# Patient Record
(demographics unavailable — no encounter records)

---

## 2024-10-16 NOTE — ASSESSMENT
[FreeTextEntry1] : Bariatric surgery history: kathryn Overweight / obesity comorbidities: nafld htn Current anti-obesity medications: none Obesity medication side effects: n/a  discussed nutrition at length education and cooking resoures shared discussed gradual incr in [place], only activities without any pain reviewed WL med use, but not interested now discuss fasting, resources shared

## 2024-10-16 NOTE — HISTORY OF PRESENT ILLNESS
[Home] : at home, [unfilled] , at the time of the visit. [Other Location: e.g. Home (Enter Location, City,State)___] : at [unfilled] [Verbal consent obtained from patient] : the patient, [unfilled] [FreeTextEntry1] : Patient presents for weight loss and overweight/obese comorbidity management  ms alvarez reports being very stuck sedentary life due to b/l severe knee OA and having a lot of hunger most days sleep is fine, mood is good has grandchildren sometimes visits weekends after 5+ minutes will have knee pain, walking not interested in WL medication, wants natural approach cooks most meals at home, some frozen UPF made at dinner mentality has been common HP CHO wary approach diet is prot/minimal starch/veg, some fruits, minimal UPF, 1 takeout per week enjoys cooking and eating a variety of foods motivated also interested in fasting therapy

## 2025-01-15 NOTE — ASSESSMENT
[FreeTextEntry1] : Bariatric surgery history: n Overweight / obesity comorbidities: nafld htn Current anti-obesity medications: n Obesity medication side effects: n  cont to maintain excellent changes new resources materials sent cont 3 - 4 mo check ins

## 2025-01-15 NOTE — HISTORY OF PRESENT ILLNESS
[Home] : at home, [unfilled] , at the time of the visit. [Other Location: e.g. Home (Enter Location, City,State)___] : at [unfilled] [Verbal consent obtained from patient] : the patient, [unfilled] [FreeTextEntry1] : Patient presents for weight loss and overweight/obese comorbidity management  doing v well 12 lb loss has been adding mostly veg to plate and not eating desserts  some interruption during period caring for mother who has ongoing health issues but now back on track, ready to review key nutrition concepts and make diet changes

## 2025-01-28 NOTE — PHYSICAL EXAM
[No Acute Distress] : no acute distress [Well Nourished] : well nourished [Well Developed] : well developed [Well-Appearing] : well-appearing [Normal Sclera/Conjunctiva] : normal sclera/conjunctiva [PERRL] : pupils equal round and reactive to light [EOMI] : extraocular movements intact [Normal Outer Ear/Nose] : the outer ears and nose were normal in appearance [Normal Oropharynx] : the oropharynx was normal [No JVD] : no jugular venous distention [No Lymphadenopathy] : no lymphadenopathy [Supple] : supple [Thyroid Normal, No Nodules] : the thyroid was normal and there were no nodules present [No Respiratory Distress] : no respiratory distress  [No Accessory Muscle Use] : no accessory muscle use [Clear to Auscultation] : lungs were clear to auscultation bilaterally [Normal Rate] : normal rate  [Regular Rhythm] : with a regular rhythm [Normal S1, S2] : normal S1 and S2 [No Murmur] : no murmur heard [No Carotid Bruits] : no carotid bruits [No Varicosities] : no varicosities [Pedal Pulses Present] : the pedal pulses are present [No Edema] : there was no peripheral edema [No Extremity Clubbing/Cyanosis] : no extremity clubbing/cyanosis [Soft] : abdomen soft [Non Tender] : non-tender [Non-distended] : non-distended [Normal Bowel Sounds] : normal bowel sounds [Normal Posterior Cervical Nodes] : no posterior cervical lymphadenopathy [Normal Anterior Cervical Nodes] : no anterior cervical lymphadenopathy [No CVA Tenderness] : no CVA  tenderness [No Spinal Tenderness] : no spinal tenderness [No Joint Swelling] : no joint swelling [Grossly Normal Strength/Tone] : grossly normal strength/tone [No Rash] : no rash [Coordination Grossly Intact] : coordination grossly intact [No Focal Deficits] : no focal deficits [Normal Gait] : normal gait [Normal Affect] : the affect was normal [Normal Insight/Judgement] : insight and judgment were intact [de-identified] : comrpession stocking on+ no pitting edema or tenderness

## 2025-01-28 NOTE — PHYSICAL EXAM
[No Acute Distress] : no acute distress [Well Nourished] : well nourished [Well Developed] : well developed [Well-Appearing] : well-appearing [Normal Sclera/Conjunctiva] : normal sclera/conjunctiva [PERRL] : pupils equal round and reactive to light [EOMI] : extraocular movements intact [Normal Outer Ear/Nose] : the outer ears and nose were normal in appearance [Normal Oropharynx] : the oropharynx was normal [No JVD] : no jugular venous distention [No Lymphadenopathy] : no lymphadenopathy [Supple] : supple [Thyroid Normal, No Nodules] : the thyroid was normal and there were no nodules present [No Respiratory Distress] : no respiratory distress  [No Accessory Muscle Use] : no accessory muscle use [Clear to Auscultation] : lungs were clear to auscultation bilaterally [Normal Rate] : normal rate  [Regular Rhythm] : with a regular rhythm [Normal S1, S2] : normal S1 and S2 [No Murmur] : no murmur heard [No Carotid Bruits] : no carotid bruits [No Varicosities] : no varicosities [Pedal Pulses Present] : the pedal pulses are present [No Edema] : there was no peripheral edema [No Extremity Clubbing/Cyanosis] : no extremity clubbing/cyanosis [Soft] : abdomen soft [Non Tender] : non-tender [Non-distended] : non-distended [Normal Bowel Sounds] : normal bowel sounds [Normal Posterior Cervical Nodes] : no posterior cervical lymphadenopathy [Normal Anterior Cervical Nodes] : no anterior cervical lymphadenopathy [No CVA Tenderness] : no CVA  tenderness [No Spinal Tenderness] : no spinal tenderness [No Joint Swelling] : no joint swelling [Grossly Normal Strength/Tone] : grossly normal strength/tone [No Rash] : no rash [Coordination Grossly Intact] : coordination grossly intact [No Focal Deficits] : no focal deficits [Normal Gait] : normal gait [Normal Affect] : the affect was normal [Normal Insight/Judgement] : insight and judgment were intact [de-identified] : comrpession stocking on+ no pitting edema or tenderness

## 2025-01-28 NOTE — ADDENDUM
[FreeTextEntry1] : This note was written by Daja Cabrera on 01/27/2025 acting as medical scribe for Dr. Lesley Harry. I, Dr. Lesley Harry, have read and attest that all the information, medical decision making and discharge instructions within are true and accurate.

## 2025-01-28 NOTE — HISTORY OF PRESENT ILLNESS
[Family Member] : family member [FreeTextEntry1] : establish care annual physical  leg swelling [de-identified] : Ms. NICOLLE AYALA is a 73 year old F with PMHx of HTN, obesity, HIV, Hep B, liver fibrosis who presents today for new patient eval and establish care. Pt reports leg swelling for 1 year. Pt is currently on hctz and was switched to carvedilol from amlo by primary doctor which helped with swelling but not resolved about 1 month ago. Leg swelling is worse at the end of the day She think shes been diagnosed with lymphedema or CVI but is not sure and wears compression stocking. Pt reports bp is normal at home in 110-120s and is only high in the office from nerves. Denies chest pain, sob, garcia, dizziness, diaphoresis, palpitations, LE swelling, orthopnea, syncope, n/v, headache.

## 2025-01-28 NOTE — HEALTH RISK ASSESSMENT
[0] : 2) Feeling down, depressed, or hopeless: Not at all (0) [PHQ-2 Negative - No further assessment needed] : PHQ-2 Negative - No further assessment needed [Never] : Never [[id number]] : 0

## 2025-01-28 NOTE — HISTORY OF PRESENT ILLNESS
[Family Member] : family member [FreeTextEntry1] : establish care annual physical  leg swelling [de-identified] : Ms. NICOLLE AYALA is a 73 year old F with PMHx of HTN, obesity, HIV, Hep B, liver fibrosis who presents today for new patient eval and establish care. Pt reports leg swelling for 1 year. Pt is currently on hctz and was switched to carvedilol from amlo by primary doctor which helped with swelling but not resolved about 1 month ago. Leg swelling is worse at the end of the day She think shes been diagnosed with lymphedema or CVI but is not sure and wears compression stocking. Pt reports bp is normal at home in 110-120s and is only high in the office from nerves. Denies chest pain, sob, garcia, dizziness, diaphoresis, palpitations, LE swelling, orthopnea, syncope, n/v, headache.

## 2025-01-28 NOTE — HEALTH RISK ASSESSMENT
[0] : 2) Feeling down, depressed, or hopeless: Not at all (0) [PHQ-2 Negative - No further assessment needed] : PHQ-2 Negative - No further assessment needed [Never] : Never [[id number]] : 0

## 2025-03-03 NOTE — HISTORY OF PRESENT ILLNESS
[FreeTextEntry1] : Ms. NICOLLE AYALA is a 73 year old F with PMHx of HTN, obesity, HIV, Hep B, liver fibrosis here to establish cardiac care. Seen by Dr. Harry on 01/27/2025. Venous duplex 01/2025 with no DVT. Echo completed 01/2025 with normal EF, ascending aorta 4.1 cm. moderate MR, mild TR, mild LVH, mild OK, and trace AR.  She reports of chest heaviness and pressing feeling on chest at times.  Denies chest pain, palpitations, diaphoresis, vision changes, HA, dizziness, syncope, cough, wheezing, SOB/CASTILLO, edema, fever, chills, infection.

## 2025-03-03 NOTE — PHYSICAL EXAM

## 2025-03-09 NOTE — END OF VISIT
[] : Fellow [Time Spent: ___ minutes] : I have spent [unfilled] minutes of time on the encounter which excludes teaching and separately reported services. [FreeTextEntry3] : I saw and evaluated the patient with NP Meenakshi.  I discussed the care of this patient with the NP providing the service, and was directly responsible for the patient's management. My discussion with the NP included the patient's history, physical exam, laboratory findings, and medical decision-making. I agree with the documented findings and plan of care of the NP's note. Spent > 30 minutes collectively in reviewing records, performing patient history and physical examination, and formulating recommendations/plan of care.

## 2025-03-09 NOTE — PLAN
[TextEntry] : PLAN - Hepatitis B virus DNA level previously remained undetectable. We will repeat bloodwork today. Recommended to continue with current HAART regimen. - Her MR elastography from April 3, 2024 was unremarkable. - I have recommended continued effort for weight loss through diet and exercise. Recommended to complete CARDS workup. - She was enrolled in the National Cancer Screening Trial after due consent. She is randomized to the standard of care arm ( US + AFP). She is due for follow up labs and US for hepatoma screening.   RTC in 6 months with repeat labs and US Abdomen.  The patient was examined, and the treatment plan was reviewed in consultation with Dr. Smith.  Homa Arrieta, MSN, FNP-BC Transplant Hepatology Nurse Practitioner [facility] [address] [place] [address] T: [phone] | F: [phone].

## 2025-03-09 NOTE — PHYSICAL EXAM
[General Appearance - Alert] : alert [General Appearance - In No Acute Distress] : in no acute distress [Sclera] : the sclera and conjunctiva were normal [Outer Ear] : the ears and nose were normal in appearance [Neck Appearance] : the appearance of the neck was normal [Arterial Pulses Carotid] : carotid pulses were normal with no bruits [Bowel Sounds] : normal bowel sounds [Abdomen Soft] : soft [Abdomen Tenderness] : non-tender [] : no hepato-splenomegaly [Abdomen Mass (___ Cm)] : no abdominal mass palpated [Abdomen Hernia] : no hernia was discovered [No CVA Tenderness] : no ~M costovertebral angle tenderness [Abnormal Walk] : normal gait [Cranial Nerves] : cranial nerves 2-12 were intact [Sensation] : the sensory exam was normal to light touch and pinprick [Motor Exam] : the motor exam was normal [Oriented To Time, Place, And Person] : oriented to person, place, and time [Affect] : the affect was normal [Scleral Icterus] : No Scleral Icterus

## 2025-03-09 NOTE — PLAN
[TextEntry] : PLAN - Hepatitis B virus DNA level previously remained undetectable. We will repeat bloodwork today. Recommended to continue with current HAART regimen. - Her MR elastography from April 3, 2024 was unremarkable. - I have recommended continued effort for weight loss through diet and exercise. Recommended to complete CARDS workup. - She was enrolled in the National Cancer Screening Trial after due consent. She is randomized to the standard of care arm ( US + AFP). She is due for follow up labs and US for hepatoma screening.   RTC in 6 months with repeat labs and US Abdomen.  The patient was examined, and the treatment plan was reviewed in consultation with Dr. Smith.  Homa Arrieta, MSN, FNP-BC Transplant Hepatology Nurse Practitioner [facility] [address] [place] [address] T: [phone] | F: [phone].

## 2025-03-09 NOTE — PLAN
[TextEntry] : PLAN - Hepatitis B virus DNA level previously remained undetectable. We will repeat bloodwork today. Recommended to continue with current HAART regimen. - Her MR elastography from April 3, 2024 was unremarkable. - I have recommended continued effort for weight loss through diet and exercise. Recommended to complete CARDS workup. - She was enrolled in the National Cancer Screening Trial after due consent. She is randomized to the standard of care arm ( US + AFP). She is due for follow up labs and US for hepatoma screening.   RTC in 6 months with repeat labs and US Abdomen.  The patient was examined, and the treatment plan was reviewed in consultation with Dr. Smtih.  Homa Arrieta, MSN, FNP-BC Transplant Hepatology Nurse Practitioner [facility] [address] [place] [address] T: [phone] | F: [phone].

## 2025-03-09 NOTE — HISTORY OF PRESENT ILLNESS
[FreeTextEntry1] : Ms. Elana Spencer, a 73-year-old Black female, presents for hepatology follow-up.  She has a history of HIV and HBV, managed with Biktarvy (bictegravir, emtricitabine, and tenofovir alafenamide), achieving undetectable HIV viral load. Her HBV is characterized by positive HBsAg and undetectable HBV DNA.  She also has a history of morbid obesity, vitamin B12 deficiency (treated with supplementation), dyslipidemia, and hypertension.  Initially seen in June 2019, she was asymptomatic with normal liver function tests (LFTs).  An abdominal ultrasound revealed a slightly ectatic distal aorta measuring 2.7 cm and an unremarkable liver.  Subsequent imaging in August 2019 identified a 1.4 cm hepatic cyst and benign renal cysts.  She reported weight loss through diet and exercise at her February 2020 visit, remaining asymptomatic with stable labs.  Follow-up imaging in March 2020 showed a normal liver.  She continued to do well throughout 2020 and 2021, maintaining normal LFTs, undetectable HBV DNA, and stable mild anemia. An October 2020 abdominal ultrasound was unremarkable.   In February 2022, she reported further weight loss and stable labs, including normal LFTs and undetectable HIV viral load.  An August 2022 follow-up revealed stable, albeit mildly anemic, blood counts and normal LFTs. An abdominal ultrasound in September 2022 showed hepatic steatosis, with a hypoechoic focus concerning for fatty sparing. A subsequent MRI did not show steatosis or HCC.    In January 2024, she reported an issue with her Biktarvy prescription, resulting in a detectable HIV viral load for the first time in a while. This was addressed with her infectious disease physician. She enrolled in the TRACER HCC screening trial, assigned to the standard of care arm.  By September 2024, she had gained weight and reported difficulty managing her hypertension despite being on carvedilol.  Repeat labs and imaging were performed, including an unremarkable MR elastography in April 2024.  Today,  she presents for follow-up. She recently underwent cardiovascular evaluation with Dr. Harry for an abdominal aortic aneurysm (AAA) and mitral valve regurgitation. Her antihypertensive regimen was adjusted and now consists of losartan 100mg daily, carvedilol 12.5mg BID, and furosemide 40mg daily.  A CT angiogram is scheduled with Dr. Krishna.  Repeat labs and HCC surveillance (currently in the SOC arm of an NIH-sponsored study) are due.

## 2025-03-09 NOTE — ASSESSMENT
[FreeTextEntry1] : Ms. Spencer is a 72-year-old female with a history of HIV, HBV, obesity, vitamin B12 deficiency, dyslipidemia, and hypertension.  Her HIV and HBV have been well-managed on Biktarvy, with historically undetectable HIV viral load and persistently undetectable HBV DNA.  She has demonstrated some fluctuation in weight over time. While she has maintained normal LFTs, imaging has revealed hepatic steatosis.  Her hypertension management is complex and has required medication adjustments.  She also has an abdominal aortic aneurysm and mitral valve regurgitation, for which she is undergoing further cardiovascular evaluation.  She is appropriately enrolled in an HCC surveillance program.  Continued monitoring of her chronic conditions is warranted.  Problems - Obesity - Chronic hepatitis B- controlled with undetectable viral load - HIV on HAART - Hypertension

## 2025-03-13 NOTE — END OF VISIT
[Time Spent: ___ minutes] : I have spent [unfilled] minutes of time on the encounter which excludes teaching and separately reported services. [] : Fellow [FreeTextEntry3] : I discussed this patient in a pre-clinic session with the fellow including review of clinical status and last labs. I was present with the resident/fellow during the key portions of the history and exam. I discussed the case with the resident/fellow and agree with the findings and plan as documented in the note.

## 2025-03-13 NOTE — ASSESSMENT
[FreeTextEntry1] : MIKE LOYD is a 73 year old woman with PMH of HIV on Biktarvy (viral load undetectable), chronic HBV (viral load undetectable), hepatic steatosis, who presents for Hematology follow up of macrocytic anemia.  # Macrocytic anemia: She has history of macrocytic anemia (Hgb 10.9 - 11.6,  - 110) since at least 2017 and was previously told she had B12 deficiency and was treated with parenteral B12. Her most recent labs show no evidence of iron/folate deficiency, hemolysis, or thyroid dysfunction. B12 is on lower range of normal, but MMA is normal. Possible contribution from liver disease. May represent early MDS but no indication for bone marrow evaluation as it would not change our management.  - Given her CBC remains stable for many years, no need for further Hematology evaluation. Patient may follow with PCP for surveillance CBCs and if new or worsening cytopenias or new concerns, she can always return to see hematology.   Seen and discussed with attending, Dr. Jacquie Thomas MD PGY-4 Heme/Onc Fellow

## 2025-03-13 NOTE — HISTORY OF PRESENT ILLNESS
[de-identified] : MIKE LOYD is a 73 year old woman with PMH of HIV on Biktarvy (viral load undetectable), chronic HBV (viral load undetectable), hepatic steatosis, who presents for Hematology follow up of macrocytic anemia.  She has history of macrocytic anemia (Hgb 10.9 - 11.6,  - 110) since at least 2017 in our records. She has normal WBC and platelet counts. She reports that she was seen by a hematologist at [facility] and was given B12 injections at two different times. She has not had an EGD evaluation. She eats a well-rounded diet.  She established care in our practice on 3/30/23. Her labs were notable Hgb 11.0 () - stable from prior CBCs. Normal B12, folate, copper, TSH, iron studies. No evidence of hemolysis. No intrinsic factor or anti-parietal cell antibodies. Unclear etiology for macrocytic anemia. May represent early MDS but no indication for bone marrow evaluation as it would not change our management.   Interval History:  - She returns today for repeat labs. She feels well overall and has no new complaints. Her Hgb is stable at 10.8 (). - She was previously on B12 supplementation for low-normal B12 in 2022 but discontinued it.   Family History: - CAD - No history of anemia  Social History: - Lives alone and is retired from working at [employer].  - No tobacco, alcohol, or drug use.

## 2025-03-24 NOTE — HISTORY OF PRESENT ILLNESS
[FreeTextEntry1] : This is a 73 year old F with PMHx of HTN, obesity, HIV, Hep B, liver fibrosis here for routine follow-up. Last seen 03/05/2025 to establish cardiac care. She reported chest heaviness and a pressing feeling on her chest at times.  Echo completed 01/2025 with normal EF, ascending aorta 4.1 cm. moderate MR, mild TR, mild LVH, mild SD, and trace AR. Order for CTCA. Elevated BP last OV. Started on losartan 100mg daily. B/l LE edema noted last OV. Started on Lasix 40mg daily. Patient to see vascular on April 3, 2025.  Patient states that she has not been experiencing chest pressure since last OV. Patient states that swelling in her legs has improved since starting Lasix 40mg daily.   Denies chest pain, palpitations, diaphoresis, vision changes, HA, dizziness, syncope, cough, wheezing, edema, fever, chills, infection.

## 2025-04-09 NOTE — ASSESSMENT
[FreeTextEntry1] : Bariatric surgery history: n Overweight / obesity comorbidities: predm nafld  Current anti-obesity medications: n Obesity medication side effects: n  contact us to review starting medication to help with nafld WL A1c otherwise follow up 6 mo or sooner prn

## 2025-04-09 NOTE — HISTORY OF PRESENT ILLNESS
[Telehealth (audio & video)] : This visit was provided via telehealth using real-time 2-way audio visual technology. [FreeTextEntry1] : Patient presents for weight loss and overweight/obese comorbidity management  doing well has maintained a ~10 lb weight loss has continued diet overall no questions or concerns walking as able but still with knee pain motivated to continue LS as means for WL but would consider WL med in future

## 2025-05-05 NOTE — HISTORY OF PRESENT ILLNESS
[FreeTextEntry1] : 3 month f/u [de-identified] : Ms. LOYD is a 73 year old F with PMHx of HTN, obesity, HIV, Hep B, liver fibrosis presenting for 3 month f/u.  Patient feels well. No complaints. Denies chest pain, sob, garcia, dizziness, orthopnea, diaphoresis, palpitations, LE swelling, syncope, n/v, headache.

## 2025-05-05 NOTE — PHYSICAL EXAM
[No Acute Distress] : no acute distress [Well Nourished] : well nourished [Well Developed] : well developed [Well-Appearing] : well-appearing [Normal Sclera/Conjunctiva] : normal sclera/conjunctiva [PERRL] : pupils equal round and reactive to light [EOMI] : extraocular movements intact [Normal Outer Ear/Nose] : the outer ears and nose were normal in appearance [Normal Oropharynx] : the oropharynx was normal [No JVD] : no jugular venous distention [No Lymphadenopathy] : no lymphadenopathy [Supple] : supple [Thyroid Normal, No Nodules] : the thyroid was normal and there were no nodules present [No Respiratory Distress] : no respiratory distress  [No Accessory Muscle Use] : no accessory muscle use [Clear to Auscultation] : lungs were clear to auscultation bilaterally [Normal Rate] : normal rate  [Regular Rhythm] : with a regular rhythm [Normal S1, S2] : normal S1 and S2 [No Murmur] : no murmur heard [No Carotid Bruits] : no carotid bruits [No Varicosities] : no varicosities [Pedal Pulses Present] : the pedal pulses are present [No Edema] : there was no peripheral edema [No Extremity Clubbing/Cyanosis] : no extremity clubbing/cyanosis [Soft] : abdomen soft [Non Tender] : non-tender [Non-distended] : non-distended [Normal Bowel Sounds] : normal bowel sounds [Normal Posterior Cervical Nodes] : no posterior cervical lymphadenopathy [Normal Anterior Cervical Nodes] : no anterior cervical lymphadenopathy [No CVA Tenderness] : no CVA  tenderness [No Spinal Tenderness] : no spinal tenderness [No Joint Swelling] : no joint swelling [Grossly Normal Strength/Tone] : grossly normal strength/tone [No Rash] : no rash [Coordination Grossly Intact] : coordination grossly intact [No Focal Deficits] : no focal deficits [Normal Gait] : normal gait [Normal Affect] : the affect was normal [Normal Insight/Judgement] : insight and judgment were intact [Alert and Oriented x3] : oriented to person, place, and time [de-identified] : lymphedema, no pitting edema

## 2025-05-05 NOTE — ADDENDUM
[FreeTextEntry1] : This note was written by Daja Caberra on 05/05/2025 acting as medical scribe for Dr. Lesley Harry. I, Dr. Lesley Harry, have read and attest that all the information, medical decision making and discharge instructions within are true and accurate.

## 2025-05-05 NOTE — HISTORY OF PRESENT ILLNESS
[FreeTextEntry1] : 3 month f/u [de-identified] : Ms. LOYD is a 73 year old F with PMHx of HTN, obesity, HIV, Hep B, liver fibrosis presenting for 3 month f/u.  Patient feels well. No complaints. Denies chest pain, sob, garcia, dizziness, orthopnea, diaphoresis, palpitations, LE swelling, syncope, n/v, headache.

## 2025-05-05 NOTE — HEALTH RISK ASSESSMENT
[0] : 2) Feeling down, depressed, or hopeless: Not at all (0) [PHQ-2 Negative - No further assessment needed] : PHQ-2 Negative - No further assessment needed [Never] : Never [[id number]] : 0

## 2025-05-05 NOTE — PHYSICAL EXAM
[No Acute Distress] : no acute distress [Well Nourished] : well nourished [Well Developed] : well developed [Well-Appearing] : well-appearing [Normal Sclera/Conjunctiva] : normal sclera/conjunctiva [PERRL] : pupils equal round and reactive to light [EOMI] : extraocular movements intact [Normal Outer Ear/Nose] : the outer ears and nose were normal in appearance [Normal Oropharynx] : the oropharynx was normal [No JVD] : no jugular venous distention [No Lymphadenopathy] : no lymphadenopathy [Supple] : supple [Thyroid Normal, No Nodules] : the thyroid was normal and there were no nodules present [No Respiratory Distress] : no respiratory distress  [No Accessory Muscle Use] : no accessory muscle use [Clear to Auscultation] : lungs were clear to auscultation bilaterally [Normal Rate] : normal rate  [Regular Rhythm] : with a regular rhythm [Normal S1, S2] : normal S1 and S2 [No Murmur] : no murmur heard [No Carotid Bruits] : no carotid bruits [No Varicosities] : no varicosities [Pedal Pulses Present] : the pedal pulses are present [No Edema] : there was no peripheral edema [No Extremity Clubbing/Cyanosis] : no extremity clubbing/cyanosis [Soft] : abdomen soft [Non Tender] : non-tender [Non-distended] : non-distended [Normal Bowel Sounds] : normal bowel sounds [Normal Posterior Cervical Nodes] : no posterior cervical lymphadenopathy [Normal Anterior Cervical Nodes] : no anterior cervical lymphadenopathy [No CVA Tenderness] : no CVA  tenderness [No Spinal Tenderness] : no spinal tenderness [No Joint Swelling] : no joint swelling [Grossly Normal Strength/Tone] : grossly normal strength/tone [No Rash] : no rash [Coordination Grossly Intact] : coordination grossly intact [No Focal Deficits] : no focal deficits [Normal Gait] : normal gait [Normal Affect] : the affect was normal [Normal Insight/Judgement] : insight and judgment were intact [Alert and Oriented x3] : oriented to person, place, and time [de-identified] : lymphedema, no pitting edema

## 2025-05-05 NOTE — HEALTH RISK ASSESSMENT
[0] : 2) Feeling down, depressed, or hopeless: Not at all (0) [PHQ-2 Negative - No further assessment needed] : PHQ-2 Negative - No further assessment needed [Never] : Never [[id number]] : 0

## 2025-05-05 NOTE — ADDENDUM
[FreeTextEntry1] : This note was written by Daja Cabrera on 05/05/2025 acting as medical scribe for Dr. Lesley Harry. I, Dr. Lesley Harry, have read and attest that all the information, medical decision making and discharge instructions within are true and accurate.

## 2025-05-05 NOTE — HEALTH RISK ASSESSMENT
[0] : 2) Feeling down, depressed, or hopeless: Not at all (0) [PHQ-2 Negative - No further assessment needed] : PHQ-2 Negative - No further assessment needed [Never] : Never [[id number]] : 0

## 2025-05-05 NOTE — HISTORY OF PRESENT ILLNESS
[FreeTextEntry1] : 3 month f/u [de-identified] : Ms. LOYD is a 73 year old F with PMHx of HTN, obesity, HIV, Hep B, liver fibrosis presenting for 3 month f/u.  Patient feels well. No complaints. Denies chest pain, sob, garcia, dizziness, orthopnea, diaphoresis, palpitations, LE swelling, syncope, n/v, headache.

## 2025-05-05 NOTE — PHYSICAL EXAM
[No Acute Distress] : no acute distress [Well Nourished] : well nourished [Well Developed] : well developed [Well-Appearing] : well-appearing [Normal Sclera/Conjunctiva] : normal sclera/conjunctiva [PERRL] : pupils equal round and reactive to light [EOMI] : extraocular movements intact [Normal Outer Ear/Nose] : the outer ears and nose were normal in appearance [Normal Oropharynx] : the oropharynx was normal [No JVD] : no jugular venous distention [No Lymphadenopathy] : no lymphadenopathy [Supple] : supple [Thyroid Normal, No Nodules] : the thyroid was normal and there were no nodules present [No Respiratory Distress] : no respiratory distress  [No Accessory Muscle Use] : no accessory muscle use [Clear to Auscultation] : lungs were clear to auscultation bilaterally [Normal Rate] : normal rate  [Regular Rhythm] : with a regular rhythm [Normal S1, S2] : normal S1 and S2 [No Murmur] : no murmur heard [No Carotid Bruits] : no carotid bruits [No Varicosities] : no varicosities [Pedal Pulses Present] : the pedal pulses are present [No Edema] : there was no peripheral edema [No Extremity Clubbing/Cyanosis] : no extremity clubbing/cyanosis [Soft] : abdomen soft [Non Tender] : non-tender [Non-distended] : non-distended [Normal Bowel Sounds] : normal bowel sounds [Normal Posterior Cervical Nodes] : no posterior cervical lymphadenopathy [Normal Anterior Cervical Nodes] : no anterior cervical lymphadenopathy [No CVA Tenderness] : no CVA  tenderness [No Spinal Tenderness] : no spinal tenderness [No Joint Swelling] : no joint swelling [Grossly Normal Strength/Tone] : grossly normal strength/tone [No Rash] : no rash [Coordination Grossly Intact] : coordination grossly intact [No Focal Deficits] : no focal deficits [Normal Gait] : normal gait [Normal Affect] : the affect was normal [Normal Insight/Judgement] : insight and judgment were intact [Alert and Oriented x3] : oriented to person, place, and time [de-identified] : lymphedema, no pitting edema

## 2025-07-28 NOTE — HEALTH RISK ASSESSMENT
[No] : In the past 12 months have you used drugs other than those required for medical reasons? No [No falls in past year] : Patient reported no falls in the past year [0] : 2) Feeling down, depressed, or hopeless: Not at all (0) [PHQ-2 Negative - No further assessment needed] : PHQ-2 Negative - No further assessment needed [Never] : Never [[id number]] : 0

## 2025-07-28 NOTE — HISTORY OF PRESENT ILLNESS
[FreeTextEntry1] : annual physical  [de-identified] : Ms. LOYD is a 73 year F with PMHX of HTN, obesity, HIV, Hep B, liver fibrosis presenting for annual physical. Patient feels well at this time, no acute complaints. Denies chest pain, sob, garcia, dizziness, diaphoresis, palpitations, LE swelling, orthopnea, syncope, n/v, headache

## 2025-07-28 NOTE — ADDENDUM
[FreeTextEntry1] : This note was written by Angel France on 07/24/2025 acting as medical scribe for Dr. Lesley Harry. I, Dr. Lesley Harry, have read and attest that all the information, medical decision making and discharge instructions within are true and accurate.

## 2025-07-28 NOTE — HISTORY OF PRESENT ILLNESS
[FreeTextEntry1] : annual physical  [de-identified] : Ms. LOYD is a 73 year F with PMHX of HTN, obesity, HIV, Hep B, liver fibrosis presenting for annual physical. Patient feels well at this time, no acute complaints. Denies chest pain, sob, garcia, dizziness, diaphoresis, palpitations, LE swelling, orthopnea, syncope, n/v, headache

## 2025-07-28 NOTE — HEALTH RISK ASSESSMENT
[No] : In the past 12 months have you used drugs other than those required for medical reasons? No [No falls in past year] : Patient reported no falls in the past year [0] : 2) Feeling down, depressed, or hopeless: Not at all (0) [PHQ-2 Negative - No further assessment needed] : PHQ-2 Negative - No further assessment needed [Never] : Never [[id number]] : 0

## 2025-07-28 NOTE — HEALTH RISK ASSESSMENT
[No] : In the past 12 months have you used drugs other than those required for medical reasons? No [No falls in past year] : Patient reported no falls in the past year [0] : 2) Feeling down, depressed, or hopeless: Not at all (0) [PHQ-2 Negative - No further assessment needed] : PHQ-2 Negative - No further assessment needed [Never] : Never [[id number]] : 0

## 2025-07-28 NOTE — HISTORY OF PRESENT ILLNESS
[FreeTextEntry1] : annual physical  [de-identified] : Ms. LOYD is a 73 year F with PMHX of HTN, obesity, HIV, Hep B, liver fibrosis presenting for annual physical. Patient feels well at this time, no acute complaints. Denies chest pain, sob, garcia, dizziness, diaphoresis, palpitations, LE swelling, orthopnea, syncope, n/v, headache

## 2025-07-30 NOTE — HISTORY OF PRESENT ILLNESS
[None] : None [FreeTextEntry1] : Ms. Irving Spencer is a very pleasant 73 year old woman here today for bilateral renal cysts. Referred by cardiologist Dr. Krishna. She had some recent imaging done with Dr. Krishna in regard to cardiac findings. Abdominal ULS 04/2025 reviewed shows right simple cyst measuring 1.2 x 1.0 x 1.3 cm, unchanged and left simple cyst measuring 2.7 x 2.7 x 2.5 cm, slightly increased in size. She denies any hematuria or dysuria. Denies any family history of urological cancers. Denies any history of smoking.

## 2025-07-30 NOTE — ASSESSMENT
[FreeTextEntry1] : Ms. Irving Spencer is a very pleasant 73 year old woman here today for bilateral renal cysts. Referred by cardiologist Dr. Krishna. She had some recent imaging done with Dr. Krishna in regard to cardiac findings. Abdominal ULS 04/2025 reviewed shows right simple cyst measuring 1.2 x 1.0 x 1.3 cm, unchanged and left simple cyst measuring 2.7 x 2.7 x 2.5 cm, slightly increased in size. She denies any hematuria or dysuria. Denies any family history of urological cancers. Denies any history of smoking. We discussed the usual benign nature of simple renal cysts We discussed the recommendation to cease screening with ultrasound for renal cysts, however she would like to follow this over time Renal ULS in 6 months.  Patient is being seen today for evaluation and management of a chronic and longitudinal ongoing condition and I am the primary treating physician